# Patient Record
Sex: FEMALE | Race: WHITE
[De-identification: names, ages, dates, MRNs, and addresses within clinical notes are randomized per-mention and may not be internally consistent; named-entity substitution may affect disease eponyms.]

---

## 2019-01-26 NOTE — NUR
MS/RN ADMISSION



PATIENT ADMITTED FROM ER, TRANSFERRED VIA GURNEY IN STABLE CONDITION. A/O X 1, CONFUSED, 
Tunisian SPEAKING ONLY. NO SIGNS OF ACUTE DISTRESS. NO COMPLAIN OF PAIN OR DISCOMFORT. 
ADMITTING DIAGNOSIS IS HYPERGLYCEMIA AND UTI. SKIN ASSESSMENT DONE. NO OPEN WOUNDS NOTED, 
SKIN NOTED INTACT AT THIS TIME. INCONTINENT OF BOWEL AND BLADDER. AMBULATE WITH ASSIST. ALL 
NEEDS ATTENDED TO. CALL LIGHT WITHIN REACH. WILL CONTINUE TO MONITOR TO ENSURE SAFETY.

## 2019-01-26 NOTE — NUR
BIB [facility] FROM [facility] FOR HYPERGLYCEMIA   AT 1030 WAS GIVEN 500 MG 
METFORMIN.  PT HAS BEEN HERE BEFORE AND IS A FLIGHT RISK.  SHE HAS A HX OF 
DEMENTIA AND IS Croatian-SPEAKING.  SHE IS AOX1, AMB, VSS, RR EVEN AND 
UNLABORED.  SKIN INTACT AND NO ACUTE DISTRESS NOTED.  READY FOR EVAL.

-------------------------------------------------------------------------------

Addendum: 01/26/19 at 1236 by WES

-------------------------------------------------------------------------------

PT IS FULL CODE

## 2019-01-26 NOTE — NUR
RN NOTES



RECEIVE PT IN THE BED A/O X 1, 1:1 SITTER AT BEDSIDE. IN STABLE CONDITION, NOT IN DISTRESS, 
SAFETY MEASURES IN PLACE. WILL CONTINUE TO MONITOR.

## 2019-01-26 NOTE — NUR
MS/RN CLOSING NOTE



PATIENT IN BED IN STABLE CONDITION. A/O X 1, CONFUSED, Lithuanian SPEAKING. TRYING TO GET OUT 
OF BED UNASSISTED. NO SIGNS OF ACUTE DISTRESS. NO COMPLAIN OF PAIN OR DISCOMFORT. ALL NEEDS 
ATTENDED TO. CALL LIGHT WITHIN REACH. WILL ENDORSE TO NEXT SHIFT FOR CONTINUITY OF CARE.

## 2019-01-27 NOTE — NUR
RECEIVED PATIENT IN BED AWAKE, AO X 1, UNCLEAR SPEECH. NO ACUTE DISTRESS NOTED. NO SIGNS OF 
PAIN NOTED. IV SITE PATENT, INTACT; IVF INFUSING AS ORDERED. SAFETY REMINDERS GIVEN. ON LOW 
BED WITH BILATERAL UPPER SIDE RAILS UP. CALL BELL WITHIN EASY REACH. WILL CONTINUE TO 
MONITOR. SITTER AT BEDSIDE.

## 2019-01-27 NOTE — NUR
MS RN NOTE

BS-358 INSULIN TO BE GIVEN ONCE FOOD AT BEDSIDE

-------------------------------------------------------------------------------

Addendum: 01/27/19 at 1236 by GARY ESCALERA RN

-------------------------------------------------------------------------------

15 UNITS OF INSULIN GIVEN. PATIENT EATING LUNCH WITH ASSISTANCE AT THIS TIME

## 2019-01-27 NOTE — NUR
MS RN OPENING NOTE

RECEIVED PATIENT RESTING COMFORTABLY IN BED AT THIS TIME. ALERT AND ORIENTED x1 FORGETFUL 
BUT NEEDS ORIENTATION TO UNIT. SITTER AT BEDSIDE FOR SAFETY. IV INTACT AND PATENT NO REDNESS 
OR SWELLING NOTED WITH IV FLUIDS RUNNING AT THIS TIME. CALL LIGHT WITHIN REACH. SAFETY 
MEASURES IMPLEMENTED.  BLOOD SUGARS TO BE MONITORED THROUGHOUT SHIFT. NO PAIN NOTED AT THIS 
TIME AND NO FACIAL GRIMACING NOTED. NO SOB OR DISTRESS NOTED ON ROOM AIR TOLERATING WELL. 
WILL CONTINUE TO MONITOR THROUGHOUT SHIFT

## 2019-01-27 NOTE — NUR
RN CLOSING NOTE



ASLEEP AND EASILY AWAKEN A/O X 1. 1:1 SITTER AT BEDSIDE, STABLE CONDITION, NOT IN DISTRESS. 
02 SAT AT 97% R.A RESPIRATIONS EVEN AND UNLABORED. NURSING CARE RENDERED, NO COMPLAIN OF 
PAIN AT THIS TIME. KEPT CLEAN AND DRY AND COMFORT, ASSISTED REPOSITION EVERY 2 HOURS AND 
PRN. SAFETY MEASURES IN PLACE, CALL LIGHT WITHIN REACH. WILL ENDORSE TO NIGHT SHIFT FOR FORTINO.

## 2019-01-27 NOTE — NUR
MS RN CLOSING NOTE

PATIENT RESTING COMFORTABLY AT THIS TIME. BLOOD SUGAR MONITORED AND INSULIN GIVEN AS NEEDED. 
IV INTACT AND PATENT ON RIGHT FOREARM WITH IV FLUIDS RUNNING AT THIS TIME. ALL DUE 
MEDICATIONS GIVEN AS ORDERED. ALL NEEDS MET. ALERT AND ORIENTED x1. SITTER AT BEDSIDE FOR 
SAFETY. LABS IN AM. CALL LIGHT WITHIN REACH AT ALL TIMES. NO FACIAL GRIMACING NOTED FOR 
PAIN. NO SOB OR DISTRESS NOTED ON ROOM AIR. WILL ENDORSE TO NIGHT SHIFT NURSE FOR FORTINO

## 2019-01-27 NOTE — NUR
MS RN NOTE

BS-170 INSULIN TO BE GIVEN WHEN FOOD AT BEDSIDE

-------------------------------------------------------------------------------

Addendum: 01/27/19 at 1755 by GARY ESCALERA RN

-------------------------------------------------------------------------------

3 UNITS OF INSULIN GIVEN AND PATIENT EATING AT  BEDSIDE AT THIS TIME

## 2019-01-28 NOTE — NUR
RN DISCHARGE NOTE

REPORT GIVEN TO FELIPE CARVALHO SUPERVISOR AT [facility]. PATIENT SENT VIA AMBULANCE IN STABLE 
CONDITION. ALL NEEDS MET. ALL DUE MEDICATIONS GIVEN AS ORDERED. BLOOD SUGARS CHECKED AND 
INSULIN GIVEN AS NEEDED. ALL DISCHARGE INSTRUCTIONS GIVEN TO RN SUPERVISOR, TEACH BACK 
RECEIVED FROM RN. ALERT AND ORIENTED x1, CONFUSED. FAMILY AWARE OF DISCHARGE. ALL BELONGINGS 
WITH PATIENT UPON DISCHARGE. IV REMOVED, SKIN INTACT. NO FACIAL GRIMACING NOTED. NO SOB OR 
DISTRESS NOTED ON ROOM AIR.

## 2019-01-28 NOTE — NUR
MS WANG NOTE

BS-232 INSULIN TO BE GIVEN ONCE FOOD AT BEDSIDE

-------------------------------------------------------------------------------

Addendum: 01/28/19 at 1222 by GARY ESCALERA RN

-------------------------------------------------------------------------------

6 UNITS OF INSULIN GIVEN WITH FOOD AT THE BEDSIDE AT THIS TIME. SITTER FEEDING PATIENT

## 2019-01-28 NOTE — NUR
PATIENT ASLEEP; RESPIRATIONS EVEN. NO SIGNS OF PAIN NOTED. NO SYMPTOMS OF 
HYPER/HYPOGLYCEMIA. DUE MEDS GIVEN WITH NO ASE NOTED. NEEDS ATTENDED. SAFETY PRECAUTIONS AND 
COMFORT MEASURES IN PLACE. WILL GIVE REPORT TO DAY SHIFT FOR CONTINUITY OF CARE.

## 2019-01-28 NOTE — NUR
MS RN OPENING NOTE

RECEIVED PATIENT ASLEEP IN BED AT THIS TIME WITH BED LOCKED IN LOWEST POSITION WITH SITTER 
AT BEDSIDE FOR SAFETY. IV INTACT AND PATENT NO REDNESS OR SWELLING NOTED. CALL LIGHT WITHIN 
REACH. SAFETY MEASURES IMPLEMENTED. NO FACIAL GRIMACING NOTED FOR PAIN. NO SOB OR DISTRESS 
NOTED ON ROOM AIR TOLERATING WELL. BLOOD SUGAR CHECKS THROUGHOUT SHIFT. WILL CONTINUE TO 
MONITOR

## 2019-01-28 NOTE — NUR
MS RN NOTE

PATIENT WAS BECOMING AGGRESSIVE WITH SITTER, MYSELF AND OTHER RN. ATTEMPTED TO CALM PATIENT 
DOWN, BUT PATIENT BECAME EVEN MORE AGITATED. BEGAN TO SMACK STAFF AND ATTEMPTING TO PULL ON 
IV LINES, YELLING AND SCREAMING. DESPITE OFFERING CALMING ALTERNATIVES INFORMED MD FOR 
AGITATION. RECEIVED ORDER FOR SOFT RESTRAINT-MITTENS. ORDER NOTED AND CARRIED OUT.

## 2019-01-28 NOTE — NUR
MS RN NOTE

PATIENT REFUSING IV MEDICATION AND IV FLUIDS AT THIS TIME. BECOMING AGGRESSIVE AND 
COMBATIVE.

## 2022-05-14 NOTE — NUR
TO ER BED 11CHUN FROM [facility] FOR G TUBE REPLACEMENT AS 
PER RN IT WAS ACCIDENTALLY PULLED AROUND 11AM TODAY, AAOX1, BREATHING EVEN AND 
NON LABORED, CONNECTED TO MONITOR, AWAITING MD FELDMAN

## 2022-05-14 NOTE — NUR
Patient discharged to home via private ambulance in stable condition. Written 
and verbal after care instructions given. Patient verbalizes understanding of 
instruction.

## 2022-05-15 NOTE — NUR
CHUN FROM [facility] FOR GTUBE REPLACEMENT. PLACED ON BED, NOT 
RESPONDING TO BERVAL STIMULI, BED RIDDEN.

## 2022-07-18 ENCOUNTER — HOSPITAL ENCOUNTER (EMERGENCY)
Dept: HOSPITAL 54 - ER | Age: 87
Discharge: NURSING, REHAB OR LONG-TERM CARE | End: 2022-07-18
Payer: MEDICARE

## 2022-07-18 VITALS — DIASTOLIC BLOOD PRESSURE: 74 MMHG | SYSTOLIC BLOOD PRESSURE: 126 MMHG

## 2022-07-18 VITALS — WEIGHT: 118 LBS | BODY MASS INDEX: 26.54 KG/M2 | HEIGHT: 56 IN

## 2022-07-18 DIAGNOSIS — Z79.84: ICD-10-CM

## 2022-07-18 DIAGNOSIS — Z79.4: ICD-10-CM

## 2022-07-18 DIAGNOSIS — M19.90: ICD-10-CM

## 2022-07-18 DIAGNOSIS — E11.9: ICD-10-CM

## 2022-07-18 DIAGNOSIS — Z79.899: ICD-10-CM

## 2022-07-18 DIAGNOSIS — K94.23: Primary | ICD-10-CM

## 2022-07-18 DIAGNOSIS — I10: ICD-10-CM

## 2022-07-18 DIAGNOSIS — F03.90: ICD-10-CM

## 2022-07-18 PROCEDURE — 74018 RADEX ABDOMEN 1 VIEW: CPT

## 2022-07-18 PROCEDURE — 99284 EMERGENCY DEPT VISIT MOD MDM: CPT

## 2022-07-18 PROCEDURE — 43762 RPLC GTUBE NO REVJ TRC: CPT

## 2022-07-18 NOTE — NUR
Patient discharged in stable condition. [facility] will bring patient back to 
[facility].

## 2022-07-18 NOTE — NUR
CHUN GUAN [id number] FRM [facility], PULLED OUT G-TUBE 20F. 
PLACED MURPHY CATHETER IN THE STOMA. TO ER BED 11, HOOKED TO MONITOR, VSS. 
CHANGED TO HOSP GOWN, WARM BLANKET PROVIDED. DR MACIEL AT BEDSIDE